# Patient Record
Sex: FEMALE | Race: WHITE | NOT HISPANIC OR LATINO | URBAN - METROPOLITAN AREA
[De-identification: names, ages, dates, MRNs, and addresses within clinical notes are randomized per-mention and may not be internally consistent; named-entity substitution may affect disease eponyms.]

---

## 2022-03-15 ENCOUNTER — APPOINTMENT (RX ONLY)
Dept: URBAN - METROPOLITAN AREA CLINIC 142 | Facility: CLINIC | Age: 37
Setting detail: DERMATOLOGY
End: 2022-03-15

## 2022-03-15 DIAGNOSIS — D22 MELANOCYTIC NEVI: ICD-10-CM

## 2022-03-15 PROBLEM — D22.39 MELANOCYTIC NEVI OF OTHER PARTS OF FACE: Status: ACTIVE | Noted: 2022-03-15

## 2022-03-15 PROCEDURE — ? BIOPSY BY SHAVE METHOD (COSMETIC)

## 2022-03-15 ASSESSMENT — LOCATION SIMPLE DESCRIPTION DERM: LOCATION SIMPLE: LEFT ZYGOMA

## 2022-03-15 ASSESSMENT — LOCATION DETAILED DESCRIPTION DERM: LOCATION DETAILED: LEFT LATERAL ZYGOMA

## 2022-03-15 ASSESSMENT — LOCATION ZONE DERM: LOCATION ZONE: FACE

## 2022-03-15 NOTE — PROCEDURE: BIOPSY BY SHAVE METHOD (COSMETIC)
X Size Of Lesion In Cm (Optional): 0
Detail Level: Detailed
Anesthesia Volume In Cc: 0.5
Render Path Notes In Note?: No
Notification Instructions: Patient will be notified of biopsy results. However, patient instructed to call the office if not contacted within 2 weeks.
Billing Type: Third-Party Bill
Wound Care: Petrolatum
Biopsy Type: H and E
Post-Care Instructions: I reviewed with the patient in detail post-care instructions. Patient is to keep the biopsy site dry overnight, and then apply bacitracin twice daily until healed. Patient may apply hydrogen peroxide soaks to remove any crusting.
Consent: Written consent was obtained and risks were reviewed including but not limited to scarring, infection, bleeding, scabbing, incomplete removal, nerve damage and allergy to anesthesia.
Anesthesia Type: 1% lidocaine without epinephrine
Biopsy Method: Dermablade
Hemostasis: Drysol

## 2022-03-15 NOTE — HPI: MOLE CHECK
What Is The Reason For Today's Visit?: Mole Check
Additional History: Patient states she wants her mole removed.

## 2022-03-15 NOTE — PROCEDURE: MIPS QUALITY
Quality 110: Preventive Care And Screening: Influenza Immunization: Influenza Immunization Ordered or Recommended, but not Administered due to system reason
Quality 154 Part A: Falls: Risk Assessment (Should Be Reported With Measure 155.): Falls risk assessment completed and documented in the past 12 months.
Quality 431: Preventive Care And Screening: Unhealthy Alcohol Use - Screening: Patient not identified as an unhealthy alcohol user when screened for unhealthy alcohol use using a systematic screening method
Quality 226: Preventive Care And Screening: Tobacco Use: Screening And Cessation Intervention: Patient screened for tobacco use and is an ex/non-smoker
Quality 154 Part B: Falls: Risk Screening (Should Be Reported With Measure 155.): Patient screened for future fall risk; documentation of no falls in the past year or only one fall without injury in the past year
Quality 111:Pneumonia Vaccination Status For Older Adults: Pneumococcal Vaccination not Administered or Previously Received, Reason not Otherwise Specified
Detail Level: Detailed
Quality 155 (Denominator): Falls Plan Of Care: Plan of Care not Documented, Reason not Otherwise Specified
Quality 47: Advance Care Plan: Advance Care Planning discussed and documented in the medical record; patient did not wish or was not able to name a surrogate decision maker or provide an advance care plan.

## 2023-04-13 ENCOUNTER — APPOINTMENT (RX ONLY)
Dept: URBAN - METROPOLITAN AREA CLINIC 142 | Facility: CLINIC | Age: 38
Setting detail: DERMATOLOGY
End: 2023-04-13

## 2023-04-13 DIAGNOSIS — L57.8 OTHER SKIN CHANGES DUE TO CHRONIC EXPOSURE TO NONIONIZING RADIATION: ICD-10-CM

## 2023-04-13 PROCEDURE — ? IN-HOUSE DISPENSING PHARMACY

## 2023-04-13 PROCEDURE — 99212 OFFICE O/P EST SF 10 MIN: CPT

## 2023-04-13 PROCEDURE — ? TREATMENT REGIMEN

## 2023-04-13 PROCEDURE — ? INVENTORY

## 2023-04-13 PROCEDURE — ? COUNSELING

## 2023-04-13 ASSESSMENT — LOCATION SIMPLE DESCRIPTION DERM
LOCATION SIMPLE: LEFT CHEEK
LOCATION SIMPLE: RIGHT CHEEK
LOCATION SIMPLE: RIGHT FOREHEAD
LOCATION SIMPLE: CHEST

## 2023-04-13 ASSESSMENT — LOCATION DETAILED DESCRIPTION DERM
LOCATION DETAILED: RIGHT INFERIOR MEDIAL FOREHEAD
LOCATION DETAILED: UPPER STERNUM
LOCATION DETAILED: RIGHT INFERIOR CENTRAL MALAR CHEEK
LOCATION DETAILED: LEFT INFERIOR CENTRAL MALAR CHEEK

## 2023-04-13 ASSESSMENT — LOCATION ZONE DERM
LOCATION ZONE: FACE
LOCATION ZONE: TRUNK

## 2023-04-13 NOTE — PROCEDURE: TREATMENT REGIMEN
Detail Level: Zone
Plan: Recommended to visit [facility] to discuss laser hair removal and other laser therapies.\\nPt can apply tretinoin to face, neck, chest and backs of hands as desired.\\nRecommend daily sunscreen and application of Vit C serum in addition to tretinoin.

## 2023-04-13 NOTE — PROCEDURE: IN-HOUSE DISPENSING PHARMACY
Product 69 Unit Type: mg
Product 48 Price/Unit (In Dollars): 0
Render Refills If Set To 0: Yes
Product 3 Amount/Unit (Numbers Only): 30
Product 2 Application Directions: Apply to face Mon, Wed, Fri night x 2 weeks, take 2 week break, then repeat once
Name Of Product 1: Acne combo gel \\n(BPO 2.5%; Clindamycin 1%; Tretinoin 0.025%)
Name Of Product 5: ROSACEA GEL with BRIMONIDINE \\n(Brimonidine Tartate 0.25% / Ivermectin 1% / Metronidazole 1% / Niacinamide 4% / Potassium Azeloyl Diglycinate 5%)
Product 2 Unit Type: ml
Product 6 Units Dispensed: 2
Detail Level: Zone
Product 6 Application Directions: Apply to face QOHS x 1wk then increase to nightly as tolerated
Name Of Product 2: Actinic Keratosis Gel\\n(Imiquimod 5% / Levocetirizine Dihydrochloride 1% / Tretinoin 0.05%)
Product 4 Application Directions: Apply to face QD
Product 3 Application Directions: Apply to face QOHS x 1 week, then increase to nightly as tolerated. 3 months on, 1 month off, repeat as needed
Name Of Product 6: Tretinoin 0.05%
Name Of Product 4: ROSACEA TRIPLE GEL\\n(Ivermectin 1% / Metronidazole 1% / Niacinamide 4% / Potassium Azeloyl Diglycinate 5%)
Product 6 Refills: 4
Name Of Product 3: HYDROQUINONE COMBO\\n(Hydrocortisone 0.05% / hydroquinone 8% / kojic acid 4% / tretinoin 0.025%)
Product 1 Application Directions: Apply to face QHS
Product 5 Application Directions: Apply to face QAM

## 2023-04-13 NOTE — HPI: PHOTOAGING (ACTINIC DAMAGE)
How Severe Is It?: mild
Is This A New Presentation, Or A Follow-Up?: Photoaging
Additional History: Would like to start in a topical tretinoin